# Patient Record
Sex: MALE
[De-identification: names, ages, dates, MRNs, and addresses within clinical notes are randomized per-mention and may not be internally consistent; named-entity substitution may affect disease eponyms.]

---

## 2022-10-03 ENCOUNTER — APPOINTMENT (OUTPATIENT)
Dept: OTOLARYNGOLOGY | Facility: CLINIC | Age: 32
End: 2022-10-03

## 2022-10-03 DIAGNOSIS — H61.23 IMPACTED CERUMEN, BILATERAL: ICD-10-CM

## 2022-10-03 DIAGNOSIS — Z87.09 PERSONAL HISTORY OF OTHER DISEASES OF THE RESPIRATORY SYSTEM: ICD-10-CM

## 2022-10-03 DIAGNOSIS — Z78.9 OTHER SPECIFIED HEALTH STATUS: ICD-10-CM

## 2022-10-03 DIAGNOSIS — H93.291 OTHER ABNORMAL AUDITORY PERCEPTIONS, RIGHT EAR: ICD-10-CM

## 2022-10-03 PROBLEM — Z00.00 ENCOUNTER FOR PREVENTIVE HEALTH EXAMINATION: Status: ACTIVE | Noted: 2022-10-03

## 2022-10-03 PROCEDURE — 69210 REMOVE IMPACTED EAR WAX UNI: CPT

## 2022-10-03 PROCEDURE — 99203 OFFICE O/P NEW LOW 30 MIN: CPT | Mod: 25

## 2022-10-03 NOTE — ASSESSMENT
2017      Singh Mccarthy  : 1966    Re: Test Results     Thank you for your recent visit to our office. We have received your test results, which were done on ____________________.     PAP Test: Your results are normal.    HPV (Human Pa [FreeTextEntry1] : Cerumen was removed from the ears. He felt better afterwards.\par \par PLAN\par \par -findings and management options discussed in detail with the patient. \par -good aural hygiene\par -avoid using cotton swabs in the ears\par -noise precautions\par -He declined an audiogram to check his hearing\par -follow up in one year if doing well\par -call and return earlier if any concerns. \par \par

## 2022-10-03 NOTE — HISTORY OF PRESENT ILLNESS
[de-identified] : SORAYA MONTOYA is a 33 year old patient Here for a 3 week history of right sided hearing loss, fullnesis, and tinnitus. He has no otalgia, otorrhea, or dizziness. He has not tried any drops . He denies a history of recurrent infections, prior otologic surgery, or ear trauma. He does have some noise exposure from using headphones. He has not had a recent audiogram